# Patient Record
Sex: FEMALE | Race: BLACK OR AFRICAN AMERICAN | URBAN - METROPOLITAN AREA
[De-identification: names, ages, dates, MRNs, and addresses within clinical notes are randomized per-mention and may not be internally consistent; named-entity substitution may affect disease eponyms.]

---

## 2021-06-03 ENCOUNTER — TELEPHONE (OUTPATIENT)
Dept: SURGERY | Age: 31
End: 2021-06-03

## 2021-06-03 NOTE — TELEPHONE ENCOUNTER
Spoke with patient and wanted to confirm appointment for Friday 6/4/21 at 9:30 am and review new patient intake form.  Patient informed me that she would like to cancel appointment because she was seen last week by another MD.

## 2023-09-19 ENCOUNTER — OFFICE VISIT (OUTPATIENT)
Age: 33
End: 2023-09-19

## 2023-09-19 VITALS
HEIGHT: 63 IN | OXYGEN SATURATION: 98 % | HEART RATE: 75 BPM | SYSTOLIC BLOOD PRESSURE: 110 MMHG | TEMPERATURE: 98.5 F | BODY MASS INDEX: 21.6 KG/M2 | DIASTOLIC BLOOD PRESSURE: 76 MMHG

## 2023-09-19 DIAGNOSIS — H02.843 SWOLLEN EYELID, RIGHT: Primary | ICD-10-CM

## 2023-09-19 RX ORDER — DIPHENHYDRAMINE HCL 25 MG
25 CAPSULE ORAL EVERY 6 HOURS PRN
Status: SHIPPED | OUTPATIENT
Start: 2023-09-19 | End: 2023-09-22

## 2023-09-19 RX ORDER — LABETALOL 100 MG/1
100 TABLET, FILM COATED ORAL 2 TIMES DAILY
COMMUNITY

## 2023-09-19 RX ORDER — METHYLPREDNISOLONE 4 MG/1
TABLET ORAL
Qty: 21 TABLET | Refills: 0 | Status: SHIPPED | OUTPATIENT
Start: 2023-09-19 | End: 2023-09-25

## 2023-10-06 ENCOUNTER — OFFICE VISIT (OUTPATIENT)
Age: 33
End: 2023-10-06

## 2023-10-06 VITALS
OXYGEN SATURATION: 97 % | SYSTOLIC BLOOD PRESSURE: 105 MMHG | HEART RATE: 92 BPM | TEMPERATURE: 98 F | BODY MASS INDEX: 31.14 KG/M2 | RESPIRATION RATE: 16 BRPM | WEIGHT: 173 LBS | DIASTOLIC BLOOD PRESSURE: 71 MMHG

## 2023-10-06 DIAGNOSIS — J02.9 ALLERGIC PHARYNGITIS: Primary | ICD-10-CM

## 2023-10-06 DIAGNOSIS — J02.9 ACUTE PHARYNGITIS, UNSPECIFIED ETIOLOGY: ICD-10-CM

## 2023-10-06 LAB
Lab: NORMAL
QC PASS/FAIL: NORMAL
S PYO AG THROAT QL: NORMAL
SARS-COV-2 RDRP RESP QL NAA+PROBE: NEGATIVE

## 2023-10-06 RX ORDER — LORATADINE 10 MG/1
10 TABLET ORAL DAILY
Qty: 7 TABLET | Refills: 0 | Status: SHIPPED | OUTPATIENT
Start: 2023-10-06 | End: 2023-10-13

## 2023-10-06 ASSESSMENT — ENCOUNTER SYMPTOMS
ANAL BLEEDING: 0
PHOTOPHOBIA: 0
SORE THROAT: 1
RECTAL PAIN: 0
EYE DISCHARGE: 0
CHOKING: 0
COUGH: 0
BLOOD IN STOOL: 0
APNEA: 0
FACIAL SWELLING: 0
STRIDOR: 0
TROUBLE SWALLOWING: 0
EYE REDNESS: 0

## 2023-12-30 ENCOUNTER — OFFICE VISIT (OUTPATIENT)
Age: 33
End: 2023-12-30

## 2023-12-30 VITALS
TEMPERATURE: 98 F | WEIGHT: 191.1 LBS | SYSTOLIC BLOOD PRESSURE: 109 MMHG | BODY MASS INDEX: 34.4 KG/M2 | DIASTOLIC BLOOD PRESSURE: 73 MMHG | OXYGEN SATURATION: 95 % | HEART RATE: 90 BPM

## 2023-12-30 DIAGNOSIS — J02.9 SORE THROAT: Primary | ICD-10-CM

## 2023-12-30 DIAGNOSIS — Z71.1 WORRIED WELL: ICD-10-CM

## 2023-12-30 LAB
Lab: NORMAL
QC PASS/FAIL: NORMAL
SARS-COV-2 RDRP RESP QL NAA+PROBE: NEGATIVE
STREPTOCOCCUS A RNA: NEGATIVE

## 2023-12-30 RX ORDER — ASPIRIN 81 MG/1
162 TABLET ORAL DAILY
COMMUNITY

## 2023-12-30 RX ORDER — FAMOTIDINE 20 MG/1
20 TABLET, FILM COATED ORAL 2 TIMES DAILY
COMMUNITY

## 2023-12-30 NOTE — PATIENT INSTRUCTIONS
Keep hydrated, tylenol  (if no contraindications) as needed if pain or fever. .gargle-cool liquids  follow up in  7- days if not better  Return sooner or go see PCP if symptoms worse/feeling worse or has new symptoms or concerns    Advised she talk with her OB about medications she can take for itchy ears/throat

## 2024-01-18 ENCOUNTER — OFFICE VISIT (OUTPATIENT)
Age: 34
End: 2024-01-18

## 2024-01-18 VITALS
SYSTOLIC BLOOD PRESSURE: 118 MMHG | HEART RATE: 95 BPM | TEMPERATURE: 97.9 F | BODY MASS INDEX: 35.13 KG/M2 | WEIGHT: 195.2 LBS | DIASTOLIC BLOOD PRESSURE: 76 MMHG | OXYGEN SATURATION: 96 %

## 2024-01-18 DIAGNOSIS — T78.3XXA ANGIOEDEMA OF LIPS, INITIAL ENCOUNTER: Primary | ICD-10-CM

## 2024-01-18 DIAGNOSIS — Z3A.22 22 WEEKS GESTATION OF PREGNANCY: ICD-10-CM

## 2024-01-18 DIAGNOSIS — O14.92 PRE-ECLAMPSIA IN SECOND TRIMESTER: ICD-10-CM

## 2024-01-18 NOTE — PROGRESS NOTES
clear. No pharyngeal swelling, oropharyngeal exudate, posterior oropharyngeal erythema or uvula swelling.     Eyes:      Conjunctiva/sclera: Conjunctivae normal.   Pulmonary:      Effort: Pulmonary effort is normal. No respiratory distress.      Breath sounds: Normal breath sounds. No wheezing.   Musculoskeletal:      Cervical back: Neck supple.   Skin:     General: Skin is dry.   Neurological:      Mental Status: She is alert and oriented to person, place, and time.   Psychiatric:         Behavior: Behavior normal.              An electronic signature was used to authenticate this note.    Cirilo Davis, APRN - CNP

## 2024-01-18 NOTE — PATIENT INSTRUCTIONS
Unclear cause of sudden onset angio edema (lip swelling).  Possible cause is aspirin started for pre-eclampsia.  Stable and reassuring exam today.  Recommend use 25 mg Benadryl today and starting 10 mg Zyrtec starting tomorrow once a day for next 7 days.  Antihistamines have been shown to be safe in pregnancy.  Hold aspirin for now.  Recommend contacting OB tomorrow morning and updating and asking for further direction.

## 2024-05-05 ENCOUNTER — OFFICE VISIT (OUTPATIENT)
Age: 34
End: 2024-05-05

## 2024-05-05 VITALS
HEART RATE: 95 BPM | HEIGHT: 62 IN | OXYGEN SATURATION: 92 % | DIASTOLIC BLOOD PRESSURE: 66 MMHG | BODY MASS INDEX: 36.8 KG/M2 | WEIGHT: 200 LBS | TEMPERATURE: 97.9 F | SYSTOLIC BLOOD PRESSURE: 103 MMHG
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
## 2024-05-05 DIAGNOSIS — S70.362A INSECT BITE OF LEFT THIGH, INITIAL ENCOUNTER: Primary | ICD-10-CM

## 2024-05-05 RX ORDER — TRIAMCINOLONE ACETONIDE 0.25 MG/G
OINTMENT TOPICAL
Qty: 20 G | Refills: 0 | Status: SHIPPED | OUTPATIENT
Start: 2024-05-05 | End: 2024-05-12

## 2024-08-26 ENCOUNTER — OFFICE VISIT (OUTPATIENT)
Age: 34
End: 2024-08-26

## 2024-08-26 VITALS
SYSTOLIC BLOOD PRESSURE: 136 MMHG | WEIGHT: 176 LBS | HEART RATE: 90 BPM | DIASTOLIC BLOOD PRESSURE: 96 MMHG | TEMPERATURE: 98.5 F | BODY MASS INDEX: 32.39 KG/M2 | HEIGHT: 62 IN

## 2024-08-26 DIAGNOSIS — J01.00 ACUTE NON-RECURRENT MAXILLARY SINUSITIS: Primary | ICD-10-CM

## 2024-08-26 DIAGNOSIS — H65.193 ACUTE MIDDLE EAR EFFUSION, BILATERAL: ICD-10-CM

## 2024-08-26 RX ORDER — FLUTICASONE PROPIONATE 50 MCG
2 SPRAY, SUSPENSION (ML) NASAL DAILY
Qty: 16 G | Refills: 0 | Status: SHIPPED | OUTPATIENT
Start: 2024-08-26

## 2024-08-26 RX ORDER — MONTELUKAST SODIUM 10 MG/1
TABLET ORAL
COMMUNITY
Start: 2024-08-09

## 2024-08-26 RX ORDER — ESCITALOPRAM OXALATE 10 MG/1
10 TABLET ORAL DAILY
COMMUNITY
Start: 2024-07-23

## 2024-08-26 ASSESSMENT — ENCOUNTER SYMPTOMS
SINUS PRESSURE: 1
SINUS PAIN: 1
RHINORRHEA: 1
ABDOMINAL PAIN: 0
SORE THROAT: 1
DIARRHEA: 0
VOMITING: 0
NAUSEA: 0
BACK PAIN: 0
SHORTNESS OF BREATH: 0
COUGH: 0

## 2024-08-26 NOTE — PATIENT INSTRUCTIONS
New Prescriptions    AMOXICILLIN-CLAVULANATE (AUGMENTIN) 875-125 MG PER TABLET    Take 1 tablet by mouth 2 times daily for 7 days    FLUTICASONE (FLONASE) 50 MCG/ACT NASAL SPRAY    2 sprays by Each Nostril route daily     Take antibiotics as prescribed.  Take tylenol and/or ibuprofen for pain/fever relief.  Use the nasal spray as directed.  Encourage rest and increase fluid intake.  Follow-up with your PCP as needed.  Return for severe/worsening symptoms.

## 2024-08-26 NOTE — PROGRESS NOTES
tenderness.   Musculoskeletal:         General: Normal range of motion.   Skin:     General: Skin is warm and dry.              An electronic signature was used to authenticate this note.    --LAURY Youngblood - CNP

## 2024-11-07 ENCOUNTER — OFFICE VISIT (OUTPATIENT)
Age: 34
End: 2024-11-07

## 2024-11-07 VITALS
HEART RATE: 90 BPM | BODY MASS INDEX: 31.98 KG/M2 | OXYGEN SATURATION: 94 % | WEIGHT: 180.5 LBS | TEMPERATURE: 98 F | DIASTOLIC BLOOD PRESSURE: 82 MMHG | SYSTOLIC BLOOD PRESSURE: 113 MMHG | HEIGHT: 63 IN

## 2024-11-07 DIAGNOSIS — N39.0 URINARY TRACT INFECTION WITHOUT HEMATURIA, SITE UNSPECIFIED: ICD-10-CM

## 2024-11-07 DIAGNOSIS — R82.90 BAD ODOR OF URINE: Primary | ICD-10-CM

## 2024-11-07 LAB
BILIRUBIN, POC: ABNORMAL
BLOOD URINE, POC: ABNORMAL
CLARITY, POC: ABNORMAL
COLOR, POC: YELLOW
GLUCOSE URINE, POC: ABNORMAL MG/DL
KETONES, POC: ABNORMAL MG/DL
LEUKOCYTE EST, POC: ABNORMAL
NITRITE, POC: ABNORMAL
PH, POC: 6
PROTEIN, POC: ABNORMAL MG/DL
SPECIFIC GRAVITY, POC: 1.02
UROBILINOGEN, POC: 0.2 MG/DL

## 2024-11-07 RX ORDER — BUPROPION HYDROCHLORIDE 150 MG/1
150 TABLET ORAL DAILY
COMMUNITY
Start: 2024-10-03

## 2024-11-07 RX ORDER — CEPHALEXIN 500 MG/1
500 CAPSULE ORAL 3 TIMES DAILY
Qty: 20 CAPSULE | Refills: 0 | Status: SHIPPED | OUTPATIENT
Start: 2024-11-07 | End: 2024-11-14

## 2024-11-07 RX ORDER — PHENAZOPYRIDINE HYDROCHLORIDE 200 MG/1
200 TABLET, FILM COATED ORAL 3 TIMES DAILY PRN
Qty: 9 TABLET | Refills: 0 | Status: SHIPPED | OUTPATIENT
Start: 2024-11-07 | End: 2024-11-10

## 2024-11-07 RX ORDER — AMLODIPINE BESYLATE 5 MG/1
5 TABLET ORAL DAILY
COMMUNITY
Start: 2024-09-30

## 2024-11-07 NOTE — PATIENT INSTRUCTIONS
Take antibiotics until complete even if feeling better. This antibiotic helps to prevent kidney infection. Take pyridium three times a day for 3 days to help with the urinary symptoms. This medication will turn your urine orange which is normal. Increase fluids especially electrolyte-infused fluids like gatorade (zero preferred due to no sugar), propel water, vitamin water, smart water, etc. You can also take tylenol and/or ibuprofen as needed for pain. If you start to run a fever or have increased pain, go to the ER.

## 2024-11-07 NOTE — PROGRESS NOTES
Maral Valencia (:  1990) is a 34 y.o. female,Established patient, here for evaluation of the following chief complaint(s):  Urinary Tract Infection (Bladder pressure, odor in urine, incomplete urination starting this morning. )         Assessment & Plan  Urinary tract infection without hematuria, site unspecified   Take antibiotics until complete even if feeling better. This antibiotic helps to prevent kidney infection. Take pyridium three times a day for 3 days to help with the urinary symptoms. This medication will turn your urine orange which is normal. Increase fluids especially electrolyte-infused fluids like gatorade (zero preferred due to no sugar), propel water, vitamin water, smart water, etc. You can also take tylenol and/or ibuprofen as needed for pain. If you start to run a fever or have increased pain, go to the ER.     Orders:    cephALEXin (KEFLEX) 500 MG capsule; Take 1 capsule by mouth 3 times daily for 7 days    phenazopyridine (PYRIDIUM) 200 MG tablet; Take 1 tablet by mouth 3 times daily as needed for Pain    Bad odor of urine       Orders:    POCT Urinalysis no Micro    Culture, Urine      No follow-ups on file.       Subjective   Pt with urinary frequency, urgency, dribbling, abdominal pain, strong odor, that started today that woke her up  6 months postpartum        History provided by:  Patient  Urinary Tract Infection  This is a new problem. The current episode started today. Pertinent negatives include no hematuria.       Review of Systems   Constitutional:  Negative for chills and fever.   Genitourinary:  Positive for difficulty urinating, dysuria, flank pain, frequency and urgency. Negative for hematuria.   All other systems reviewed and are negative.         Objective   Physical Exam  Vitals reviewed.   Constitutional:       Appearance: Normal appearance.   Cardiovascular:      Rate and Rhythm: Normal rate and regular rhythm.      Heart sounds: Normal heart sounds.

## 2024-11-09 LAB
BACTERIA UR CULT: ABNORMAL
ORGANISM: ABNORMAL

## 2025-03-26 ENCOUNTER — OFFICE VISIT (OUTPATIENT)
Age: 35
End: 2025-03-26

## 2025-03-26 VITALS
BODY MASS INDEX: 34.04 KG/M2 | HEART RATE: 97 BPM | DIASTOLIC BLOOD PRESSURE: 88 MMHG | WEIGHT: 185 LBS | OXYGEN SATURATION: 93 % | HEIGHT: 62 IN | SYSTOLIC BLOOD PRESSURE: 124 MMHG | TEMPERATURE: 99.4 F

## 2025-03-26 DIAGNOSIS — Z20.818 EXPOSURE TO STREP THROAT: ICD-10-CM

## 2025-03-26 DIAGNOSIS — R05.9 COUGH, UNSPECIFIED TYPE: ICD-10-CM

## 2025-03-26 DIAGNOSIS — J01.00 ACUTE MAXILLARY SINUSITIS, RECURRENCE NOT SPECIFIED: Primary | ICD-10-CM

## 2025-03-26 LAB
INFLUENZA VIRUS A RNA: NEGATIVE
INFLUENZA VIRUS B RNA: NEGATIVE
STREPTOCOCCUS A RNA: NEGATIVE

## 2025-03-26 RX ORDER — FLUTICASONE PROPIONATE 50 MCG
1 SPRAY, SUSPENSION (ML) NASAL DAILY
Qty: 16 G | Refills: 0 | Status: SHIPPED | OUTPATIENT
Start: 2025-03-26

## 2025-03-26 RX ORDER — AZITHROMYCIN 250 MG/1
TABLET, FILM COATED ORAL
Qty: 6 TABLET | Refills: 0 | Status: SHIPPED | OUTPATIENT
Start: 2025-03-26 | End: 2025-04-05

## 2025-03-26 RX ORDER — MEDROXYPROGESTERONE ACETATE 150 MG/ML
150 INJECTION, SUSPENSION INTRAMUSCULAR
COMMUNITY

## 2025-03-26 ASSESSMENT — ENCOUNTER SYMPTOMS
SORE THROAT: 1
COUGH: 1

## 2025-03-26 NOTE — PROGRESS NOTES
Maral Valencia (:  1990) is a 34 y.o. female,Established patient, here for evaluation of the following chief complaint(s):  Cold Symptoms (Sore throat, cough, sore throat, ear pain x3 days)      ASSESSMENT/PLAN:  1. Exposure to strep throat  - POCT Rapid Strep A DNA ([facility]re i) NEGATIVE    2. Cough, unspecified type  - POCT Influenza A/B DNA (Alere i) NEGATIVE  - fluticasone (FLONASE) 50 MCG/ACT nasal spray; 1 spray by Each Nostril route daily  Dispense: 16 g; Refill: 0    3. Acute maxillary sinusitis, recurrence not specified  - azithromycin (ZITHROMAX) 250 MG tablet; 500mg on day 1 followed by 250mg on days 2 - 5  Dispense: 6 tablet; Refill: 0  - fluticasone (FLONASE) 50 MCG/ACT nasal spray; 1 spray by Each Nostril route daily  Dispense: 16 g; Refill: 0       Return if symptoms worsen or fail to improve.    SUBJECTIVE/OBJECTIVE:  PRESENT TO CLINIC WITH THROAT HURT,COUGH AND EARACHE FOR THREE DAYS. SICK CONTACT AT WORK. WORRY AS MANY PEOPLE HAS FLU AND EXPOSED TO STREP INFECTION.      History provided by:  Patient  Cold Symptoms   Associated symptoms include coughing, ear pain and a sore throat.       Vitals:    25 1030   BP: 124/88   BP Site: Right Upper Arm   Patient Position: Sitting   BP Cuff Size: Medium Adult   Pulse: 97   Temp: 99.4 °F (37.4 °C)   TempSrc: Oral   SpO2: 93%   Weight: 83.9 kg (185 lb)   Height: 1.575 m (5' 2\")       Review of Systems   HENT:  Positive for ear pain and sore throat.    Respiratory:  Positive for cough.        Physical Exam  Constitutional:       Appearance: Normal appearance.   HENT:      Head: Normocephalic and atraumatic.      Nose: Nose normal.      Mouth/Throat:      Pharynx: Posterior oropharyngeal erythema present.   Eyes:      Pupils: Pupils are equal, round, and reactive to light.   Pulmonary:      Effort: Pulmonary effort is normal.      Breath sounds: Normal breath sounds.   Musculoskeletal:         General: Normal range of motion.      Cervical back: